# Patient Record
Sex: FEMALE | Race: WHITE | Employment: OTHER | URBAN - METROPOLITAN AREA
[De-identification: names, ages, dates, MRNs, and addresses within clinical notes are randomized per-mention and may not be internally consistent; named-entity substitution may affect disease eponyms.]

---

## 2017-07-24 ENCOUNTER — OFFICE VISIT (OUTPATIENT)
Dept: INTERNAL MEDICINE | Facility: CLINIC | Age: 72
End: 2017-07-24

## 2017-07-24 VITALS
HEIGHT: 63 IN | SYSTOLIC BLOOD PRESSURE: 130 MMHG | WEIGHT: 144 LBS | DIASTOLIC BLOOD PRESSURE: 64 MMHG | BODY MASS INDEX: 25.52 KG/M2

## 2017-07-24 DIAGNOSIS — G31.84 MILD COGNITIVE IMPAIRMENT WITH MEMORY LOSS: Primary | ICD-10-CM

## 2017-07-24 DIAGNOSIS — E78.00 PURE HYPERCHOLESTEROLEMIA: ICD-10-CM

## 2017-07-24 DIAGNOSIS — Z00.00 ROUTINE HEALTH MAINTENANCE: ICD-10-CM

## 2017-07-24 DIAGNOSIS — Z11.59 SCREENING FOR VIRAL DISEASE: ICD-10-CM

## 2017-07-24 DIAGNOSIS — E55.9 VITAMIN D DEFICIENCY: ICD-10-CM

## 2017-07-24 LAB
25(OH)D3 SERPL-MCNC: 24.8 NG/ML
ALBUMIN SERPL-MCNC: 4.3 G/DL (ref 3.2–4.8)
ALBUMIN/GLOB SERPL: 1.5 G/DL (ref 1.5–2.5)
ALP SERPL-CCNC: 93 U/L (ref 25–100)
ALT SERPL W P-5'-P-CCNC: 16 U/L (ref 7–40)
ANION GAP SERPL CALCULATED.3IONS-SCNC: 8 MMOL/L (ref 3–11)
ARTICHOKE IGE QN: 178 MG/DL (ref 0–130)
AST SERPL-CCNC: 21 U/L (ref 0–33)
BASOPHILS # BLD AUTO: 0.05 10*3/MM3 (ref 0–0.2)
BASOPHILS NFR BLD AUTO: 0.5 % (ref 0–1)
BILIRUB SERPL-MCNC: 0.6 MG/DL (ref 0.3–1.2)
BUN BLD-MCNC: 11 MG/DL (ref 9–23)
BUN/CREAT SERPL: 15.7 (ref 7–25)
CALCIUM SPEC-SCNC: 9.6 MG/DL (ref 8.7–10.4)
CHLORIDE SERPL-SCNC: 107 MMOL/L (ref 99–109)
CHOLEST SERPL-MCNC: 275 MG/DL (ref 0–200)
CO2 SERPL-SCNC: 27 MMOL/L (ref 20–31)
CREAT BLD-MCNC: 0.7 MG/DL (ref 0.6–1.3)
DEPRECATED RDW RBC AUTO: 42.6 FL (ref 37–54)
EOSINOPHIL # BLD AUTO: 0.11 10*3/MM3 (ref 0–0.3)
EOSINOPHIL NFR BLD AUTO: 1.2 % (ref 0–3)
ERYTHROCYTE [DISTWIDTH] IN BLOOD BY AUTOMATED COUNT: 13.1 % (ref 11.3–14.5)
GFR SERPL CREATININE-BSD FRML MDRD: 82 ML/MIN/1.73
GLOBULIN UR ELPH-MCNC: 2.8 GM/DL
GLUCOSE BLD-MCNC: 90 MG/DL (ref 70–100)
HCT VFR BLD AUTO: 44.1 % (ref 34.5–44)
HCV AB SER DONR QL: NORMAL
HDLC SERPL-MCNC: 81 MG/DL (ref 40–60)
HGB BLD-MCNC: 14.9 G/DL (ref 11.5–15.5)
IMM GRANULOCYTES # BLD: 0.03 10*3/MM3 (ref 0–0.03)
IMM GRANULOCYTES NFR BLD: 0.3 % (ref 0–0.6)
LYMPHOCYTES # BLD AUTO: 1.91 10*3/MM3 (ref 0.6–4.8)
LYMPHOCYTES NFR BLD AUTO: 20.5 % (ref 24–44)
MCH RBC QN AUTO: 30 PG (ref 27–31)
MCHC RBC AUTO-ENTMCNC: 33.8 G/DL (ref 32–36)
MCV RBC AUTO: 88.7 FL (ref 80–99)
MONOCYTES # BLD AUTO: 0.58 10*3/MM3 (ref 0–1)
MONOCYTES NFR BLD AUTO: 6.2 % (ref 0–12)
NEUTROPHILS # BLD AUTO: 6.64 10*3/MM3 (ref 1.5–8.3)
NEUTROPHILS NFR BLD AUTO: 71.3 % (ref 41–71)
PLATELET # BLD AUTO: 264 10*3/MM3 (ref 150–450)
PMV BLD AUTO: 10.4 FL (ref 6–12)
POTASSIUM BLD-SCNC: 4.6 MMOL/L (ref 3.5–5.5)
PROT SERPL-MCNC: 7.1 G/DL (ref 5.7–8.2)
RBC # BLD AUTO: 4.97 10*6/MM3 (ref 3.89–5.14)
SODIUM BLD-SCNC: 142 MMOL/L (ref 132–146)
TRIGL SERPL-MCNC: 140 MG/DL (ref 0–150)
TSH SERPL DL<=0.05 MIU/L-ACNC: 2.19 MIU/ML (ref 0.35–5.35)
WBC NRBC COR # BLD: 9.32 10*3/MM3 (ref 3.5–10.8)

## 2017-07-24 PROCEDURE — 82306 VITAMIN D 25 HYDROXY: CPT | Performed by: INTERNAL MEDICINE

## 2017-07-24 PROCEDURE — 85025 COMPLETE CBC W/AUTO DIFF WBC: CPT | Performed by: INTERNAL MEDICINE

## 2017-07-24 PROCEDURE — 84443 ASSAY THYROID STIM HORMONE: CPT | Performed by: INTERNAL MEDICINE

## 2017-07-24 PROCEDURE — 80061 LIPID PANEL: CPT | Performed by: INTERNAL MEDICINE

## 2017-07-24 PROCEDURE — 86803 HEPATITIS C AB TEST: CPT | Performed by: INTERNAL MEDICINE

## 2017-07-24 PROCEDURE — 99215 OFFICE O/P EST HI 40 MIN: CPT | Performed by: INTERNAL MEDICINE

## 2017-07-24 PROCEDURE — 80053 COMPREHEN METABOLIC PANEL: CPT | Performed by: INTERNAL MEDICINE

## 2017-07-24 NOTE — PROGRESS NOTES
Chief Complaint   Patient presents with   • Follow-up     Mild cognitive impairment with memory loss       History of Present Illness  71 y.o.  woman, accompanied by her daughter Ana, presents for follow-up re: memory issues.  Patient herself doesn't feel significant concerns.  Daughter states that patient has become delinquent with bills.  She is asking the same questions repetitively.  There are some concerned about her driving.  There is an issue with short term memory.  Example, patient keeps buying lotions, and now has about 30 bottles at her home.  Finances have become an issue with large quantities of $ are missing from the bank.  Ana is looking into assisted living but patient is reluctant.    Patient enjoys reading.     Family is working on selling her house.    Patient thinks she took the rest of the donepezil but daughter does not think so and notes s/e of diarrhea.  They did not start the exelon because patient has sensitive skin at baseline; can't even use papertape for procedures/surgeries.      Review of Systems  ROS neg for CP, palpitations, SOB, lightheadedness, falls, mood changes.  Continues to dress herself and perform her ADLs.  Daughter states patient is gaining weight, therefore no concerns bout not eating.  All other ROS reviewed and negative.    [facility]  The following portions of the patient's history were reviewed and updated as appropriate: allergies, current medications, past family history, past medical history, past social history, past surgical history and problem list.  SH: lives alone and currently manages own meds; no family here in [facility]; daughter Lv lives in [place]; son with the grandson just moved to [place], [place]      Current Outpatient Prescriptions:   •  Ascorbic Acid (VITAMIN C ER) 1000 MG tablet controlled-release, Take 1 tablet by mouth daily., Disp: , Rfl:   •  Calcium Carbonate (CALCIUM CHEW) 500 MG chewable tablet, Chew 1 tablet every other day., Disp: ,  "Rfl:   •  Potassium 75 MG tablet, Take 1 tablet by mouth every other day., Disp: , Rfl:   •  raloxifene (EVISTA) 60 MG tablet, TAKE 1 TABLET BY MOUTH EVERY DAY, Disp: 90 tablet, Rfl: 0    VITALS:  /64  Ht 62.5\" (158.8 cm)  Wt 144 lb (65.3 kg)  BMI 25.92 kg/m2    Physical Exam   Constitutional: She is oriented to person, place, and time. She appears well-developed and well-nourished.   Eyes: Conjunctivae and EOM are normal.   Cardiovascular: Normal rate, regular rhythm and normal heart sounds.    Pulmonary/Chest: Effort normal and breath sounds normal.   Abdominal: Soft. Bowel sounds are normal.   Musculoskeletal:   Normal gait   Neurological: She is alert and oriented to person, place, and time.   Psychiatric: She has a normal mood and affect. Her behavior is normal.   Nursing note and vitals reviewed.    LABS  5/16 B12 358, TSH 1.569    ASSESSMENT/PLAN  Problem List Items Addressed This Visit     Hyperlipidemia     Update lipids, goal LDL < 130         Vitamin D deficiency     Check level and rec supplementation as indicated; currently only with Ca supplementation - patient's daughter states patient has not been taking OTC vit D         Relevant Orders    Vitamin D 25 Hydroxy (Completed)    Mild cognitive impairment with memory loss - Primary     Reviewed stable MMSE 27/30 and review not unusual to lose short term memory first; reviewed med options, noting diarrhea with donepezil and skin sensitivity to exelon patch; proceed with Namenda XR started pack - 4 week ramp-up to 28mg QD - call back for RX if tolerates medication; daughter states she is already POA; discussed assisted living facility could actually be good for mental health with increased social stimulation; offered neuro consultation, which patient/daughter declined; discussed option of ST consult for cognitive therapy; discussed [facility] OT driving eval to assess safety of driving; in interim, strongly recommend \"brain exercises\" not only with " reading but other puzzles/exercises (she does not want to do coloring books)         Relevant Orders    TSH (Completed)      Other Visit Diagnoses     Screening for viral disease        Relevant Orders    Hepatitis C Antibody (Completed)    Routine health maintenance        Relevant Orders    CBC & Differential (Completed)    Comprehensive Metabolic Panel (Completed)    Lipid Panel (Completed)    Urinalysis With Microscopic    CBC Auto Differential (Completed)    Urinalysis    Urinalysis, Microscopic Only      Time Documentation  Counseled patient and daughter Lv  Counseling topics: diagnosis, treatment options, follow up plan and discussion of mental health issues  Total encounter time: counseling time more than 50% of visit: 45 minutes    FOLLOW-UP  RTC 1mo for updated wellness visit and f/u on initiation of Namenda XR; labs done on the way out the door    Electronically signed by:    Denia Rodriguez MD  07/24/2017

## 2017-07-25 NOTE — ASSESSMENT & PLAN NOTE
Check level and rec supplementation as indicated; currently only with Ca supplementation - patient's daughter states patient has not been taking OTC vit D

## 2017-07-25 NOTE — PROGRESS NOTES
Labs overall stable except for very abnormal cholesterol profile and bord vitamin D. Will review details at your wellness visit in 1 month.  In the interim, add OTC vitamin D3 1000 units daily and decrease the saturated fats/fried foods in the diet

## 2017-07-25 NOTE — ASSESSMENT & PLAN NOTE
"Reviewed stable MMSE 27/30 and review not unusual to lose short term memory first; reviewed med options, noting diarrhea with donepezil and skin sensitivity to exelon patch; proceed with Namenda XR started pack - 4 week ramp-up to 28mg QD - call back for RX if tolerates medication; daughter states she is already POA; discussed assisted living facility could actually be good for mental health with increased social stimulation; offered neuro consultation, which patient/daughter declined; discussed option of ST consult for cognitive therapy; discussed [facility] OT driving eval to assess safety of driving; in interim, strongly recommend \"brain exercises\" not only with reading but other puzzles/exercises (she does not want to do coloring books)  "

## 2017-07-26 ENCOUNTER — TELEPHONE (OUTPATIENT)
Dept: INTERNAL MEDICINE | Facility: CLINIC | Age: 72
End: 2017-07-26

## 2017-07-26 NOTE — TELEPHONE ENCOUNTER
----- Message from Denia Rodriguez MD sent at 7/25/2017  1:14 AM EDT -----  Labs overall stable except for very abnormal cholesterol profile and bord vitamin D. Will review details at your wellness visit in 1 month.  In the interim, add OTC vitamin D3 1000 units daily and decrease the saturated fats/fried foods in the diet

## 2017-08-29 ENCOUNTER — OFFICE VISIT (OUTPATIENT)
Dept: INTERNAL MEDICINE | Facility: CLINIC | Age: 72
End: 2017-08-29

## 2017-08-29 VITALS — WEIGHT: 140 LBS | DIASTOLIC BLOOD PRESSURE: 70 MMHG | BODY MASS INDEX: 25.2 KG/M2 | SYSTOLIC BLOOD PRESSURE: 126 MMHG

## 2017-08-29 DIAGNOSIS — H93.11 TINNITUS, RIGHT EAR: ICD-10-CM

## 2017-08-29 DIAGNOSIS — M81.0 OSTEOPOROSIS: ICD-10-CM

## 2017-08-29 DIAGNOSIS — G31.84 MILD COGNITIVE IMPAIRMENT WITH MEMORY LOSS: ICD-10-CM

## 2017-08-29 DIAGNOSIS — Z12.11 ENCOUNTER FOR SCREENING COLONOSCOPY: ICD-10-CM

## 2017-08-29 DIAGNOSIS — E78.00 PURE HYPERCHOLESTEROLEMIA: ICD-10-CM

## 2017-08-29 DIAGNOSIS — Z00.00 MEDICARE ANNUAL WELLNESS VISIT, SUBSEQUENT: Primary | ICD-10-CM

## 2017-08-29 DIAGNOSIS — E55.9 VITAMIN D DEFICIENCY: ICD-10-CM

## 2017-08-29 DIAGNOSIS — C50.912 MALIGNANT NEOPLASM OF LEFT FEMALE BREAST, UNSPECIFIED SITE OF BREAST: ICD-10-CM

## 2017-08-29 DIAGNOSIS — Z12.31 ENCOUNTER FOR SCREENING MAMMOGRAM FOR MALIGNANT NEOPLASM OF BREAST: ICD-10-CM

## 2017-08-29 PROCEDURE — G0439 PPPS, SUBSEQ VISIT: HCPCS | Performed by: INTERNAL MEDICINE

## 2017-08-29 PROCEDURE — 93000 ELECTROCARDIOGRAM COMPLETE: CPT | Performed by: INTERNAL MEDICINE

## 2017-08-29 PROCEDURE — G0444 DEPRESSION SCREEN ANNUAL: HCPCS | Performed by: INTERNAL MEDICINE

## 2017-08-29 PROCEDURE — 90662 IIV NO PRSV INCREASED AG IM: CPT | Performed by: INTERNAL MEDICINE

## 2017-08-29 PROCEDURE — 99214 OFFICE O/P EST MOD 30 MIN: CPT | Performed by: INTERNAL MEDICINE

## 2017-08-29 PROCEDURE — G0008 ADMIN INFLUENZA VIRUS VAC: HCPCS | Performed by: INTERNAL MEDICINE

## 2017-08-29 RX ORDER — MELATONIN
1000 DAILY
COMMUNITY

## 2017-08-29 RX ORDER — RALOXIFENE HYDROCHLORIDE 60 MG/1
60 TABLET, FILM COATED ORAL DAILY
Qty: 90 TABLET | Refills: 3 | Status: SHIPPED | OUTPATIENT
Start: 2017-08-29 | End: 2019-04-07 | Stop reason: SDUPTHER

## 2017-08-29 RX ORDER — ASPIRIN 81 MG/1
81 TABLET ORAL DAILY
COMMUNITY

## 2017-08-29 RX ORDER — MEMANTINE HYDROCHLORIDE 28 MG/1
28 CAPSULE, EXTENDED RELEASE ORAL DAILY
COMMUNITY
End: 2017-08-29 | Stop reason: SDUPTHER

## 2017-08-29 RX ORDER — MEMANTINE HYDROCHLORIDE 28 MG/1
28 CAPSULE, EXTENDED RELEASE ORAL DAILY
Qty: 90 CAPSULE | Refills: 3 | Status: SHIPPED | OUTPATIENT
Start: 2017-08-29

## 2017-08-29 NOTE — ASSESSMENT & PLAN NOTE
Remains on Namenda XR 28mg QD #90, 3RF without s/e; strongly encouraged consideration of CBT with speech therapy for further reinforcement for memory learning/exercises; also rec consider [facility] Driving Eval to ensure safety on the roads; patient likely not fully cognizant of extent of memory loss and is compensating

## 2017-08-29 NOTE — ASSESSMENT & PLAN NOTE
Lipids elevated with ; decrease saturated fats and cholesterol in the diet; repeat lipids in 3-4 mos

## 2017-08-29 NOTE — ASSESSMENT & PLAN NOTE
Calcium and vitamin D supplementation with weight-bearing exercise; DEXA ordered (last 12/14); note also on raloxifene 60mg QD

## 2017-08-29 NOTE — PROGRESS NOTES
ANNUAL WELLNESS VISIT    DRUG AND ALCOHOL USE      no tobacco use, alcohol intake:social drinker and caffeine intake: 1 cups of caffeinated coffee per day    DIET AND PHYSICAL ACTIVITY     Diet: general    Exercise: frequently   Exercise Details: walking    MOOD DISORDER AND COGNITIVE SCREENING   Depression Screening Tool Used yes - see PHQ-9   Anxiety Screening Tool Used yes     Mini-Cog Performed   Yes    1. Tell Patient 3 Words table,car,book    2. Administer Clock Test normal    3. Recall 3 words  table,car,book    4. Number Correct Items 3    FUNCTIONAL ABILITY AND LEVEL OF SAFETY   Hearing no hearing loss     Wears Hearing Aids No       Current Activities Independent      none  - see Funct/Cog Status Intake     Fall Risk Assessment       Has difficulty with walking or balance  No         Timed Up and Go (TUG) Test  6 sec.       If >12 sec, normal    ADVANCED DIRECTIVE has an advance directive - a copy has been provided and is in file    PAIN SCREENING Do you have pain right now? no        Recent Hospitalizations:  No hospitalization(s) within the last year..     MEDICATION REVIEW   - updated and reviewed (see Medication List).   - reviewed for potentially harmful drug-disease interactions in the elderly.   - reviewed for high risk medications in the elderly.   - aspirin use: already on 81mg QD ASA  ____________________________________  Chief Complaint   Patient presents with   • Medicare Wellness       History of Present Illness  71 y.o.  woman presents for updated wellness visit.  Finished 6 wks of namenda about 1 week ago without side effects.  Requesting refill for that and evista.  Not sure who has been prescribing evista.  Not sure when last mammogram or eye exam was.  States she sees eye doctor Dr. Kitchen every 6 mos.    Review of Systems  Denies headaches, visual changes, CP, palpitations, SOB, cough, abd pain, n/v/d, difficulty with urination, numbness/tingling, falls, mood changes,  lightheadedness, hearing changes, rashes.    Notes hearing is good and can hear pin drop but complains of ringing of the ears R>L.  Notes remote h/o seeing ENT, who just told her to buy a noise box to use at her bedside; patient did not follow this rec.     ROS (+) for ringing of ears R>L,      All other ROS reviewed and negative.    [facility]  The following portions of the patient's history were reviewed and updated as appropriate: allergies, current medications, past family history, past medical history, past social history, past surgical history and problem list.      Current Outpatient Prescriptions:   •  aspirin 81 MG EC tablet, Take 81 mg by mouth Daily., Disp: , Rfl:   •  cholecalciferol (VITAMIN D3) 1000 units tablet, Take 1,000 Units by mouth Daily., Disp: , Rfl:   •  memantine (NAMENDA XR) 28 MG capsule sustained-release 24 hr extended release capsule, Take 1 capsule by mouth Daily., Disp: 90 capsule, Rfl: 3  •  Ascorbic Acid (VITAMIN C ER) 1000 MG tablet controlled-release, Take 1 tablet by mouth daily., Disp: , Rfl:   •  Calcium Carbonate (CALCIUM CHEW) 500 MG chewable tablet, Chew 1 tablet every other day., Disp: , Rfl:   •  Potassium 75 MG tablet, Take 1 tablet by mouth every other day., Disp: , Rfl:   •  raloxifene (EVISTA) 60 MG tablet, Take 1 tablet by mouth Daily., Disp: 90 tablet, Rfl: 3    VITALS:  /70  Wt 140 lb (63.5 kg)  BMI 25.2 kg/m2    Physical Exam   Constitutional: She is oriented to person, place, and time. She appears well-developed and well-nourished.   HENT:   Head: Normocephalic.   Right Ear: External ear normal.   Left Ear: External ear normal.   Nose: Nose normal.   Mouth/Throat: Oropharynx is clear and moist and mucous membranes are normal. No oropharyngeal exudate.   Mild cerumen bilat ears; finger rubbing hearing intact bilaterally   Eyes: Conjunctivae and EOM are normal. Pupils are equal, round, and reactive to light.   Neck: Normal range of motion. Neck supple. Carotid  bruit is not present (bilaterally). No thyromegaly present.   Cardiovascular: Normal rate, regular rhythm and normal heart sounds.    Pulmonary/Chest: Effort normal and breath sounds normal. No respiratory distress.   Abdominal: Soft. Bowel sounds are normal. She exhibits no distension and no mass. There is no hepatosplenomegaly. There is no tenderness.   Genitourinary:   Genitourinary Comments: Breast exam unremarkable reconstruction on left side and unremarkable on right side, without masses, skin changes, nipple discharge, or axillary adenopathy.     Musculoskeletal: Normal range of motion. She exhibits no edema.   Lymphadenopathy:     She has no cervical adenopathy.   Neurological: She is alert and oriented to person, place, and time. She has normal reflexes. She displays normal reflexes. No cranial nerve deficit.   Skin: Skin is warm and dry. No rash noted.   Psychiatric: She has a normal mood and affect. Her behavior is normal.   Nursing note and vitals reviewed.      LABS  Results for orders placed or performed in visit on 07/24/17   Comprehensive Metabolic Panel   Result Value Ref Range    Glucose 90 70 - 100 mg/dL    BUN 11 9 - 23 mg/dL    Creatinine 0.70 0.60 - 1.30 mg/dL    Sodium 142 132 - 146 mmol/L    Potassium 4.6 3.5 - 5.5 mmol/L    Chloride 107 99 - 109 mmol/L    CO2 27.0 20.0 - 31.0 mmol/L    Calcium 9.6 8.7 - 10.4 mg/dL    Total Protein 7.1 5.7 - 8.2 g/dL    Albumin 4.30 3.20 - 4.80 g/dL    ALT (SGPT) 16 7 - 40 U/L    AST (SGOT) 21 0 - 33 U/L    Alkaline Phosphatase 93 25 - 100 U/L    Total Bilirubin 0.6 0.3 - 1.2 mg/dL    eGFR Non African Amer 82 >60 mL/min/1.73    Globulin 2.8 gm/dL    A/G Ratio 1.5 1.5 - 2.5 g/dL    BUN/Creatinine Ratio 15.7 7.0 - 25.0    Anion Gap 8.0 3.0 - 11.0 mmol/L   Lipid Panel   Result Value Ref Range    Total Cholesterol 275 (H) 0 - 200 mg/dL    Triglycerides 140 0 - 150 mg/dL    HDL Cholesterol 81 (H) 40 - 60 mg/dL    LDL Cholesterol  178 (H) 0 - 130 mg/dL   TSH    Result Value Ref Range    TSH 2.195 0.350 - 5.350 mIU/mL   Vitamin D 25 Hydroxy   Result Value Ref Range    25 Hydroxy, Vitamin D 24.8 ng/ml   Hepatitis C Antibody   Result Value Ref Range    Hepatitis C Ab Non-Reactive Non-Reactive   CBC Auto Differential   Result Value Ref Range    WBC 9.32 3.50 - 10.80 10*3/mm3    RBC 4.97 3.89 - 5.14 10*6/mm3    Hemoglobin 14.9 11.5 - 15.5 g/dL    Hematocrit 44.1 (H) 34.5 - 44.0 %    MCV 88.7 80.0 - 99.0 fL    MCH 30.0 27.0 - 31.0 pg    MCHC 33.8 32.0 - 36.0 g/dL    RDW 13.1 11.3 - 14.5 %    RDW-SD 42.6 37.0 - 54.0 fl    MPV 10.4 6.0 - 12.0 fL    Platelets 264 150 - 450 10*3/mm3    Neutrophil % 71.3 (H) 41.0 - 71.0 %    Lymphocyte % 20.5 (L) 24.0 - 44.0 %    Monocyte % 6.2 0.0 - 12.0 %    Eosinophil % 1.2 0.0 - 3.0 %    Basophil % 0.5 0.0 - 1.0 %    Immature Grans % 0.3 0.0 - 0.6 %    Neutrophils, Absolute 6.64 1.50 - 8.30 10*3/mm3    Lymphocytes, Absolute 1.91 0.60 - 4.80 10*3/mm3    Monocytes, Absolute 0.58 0.00 - 1.00 10*3/mm3    Eosinophils, Absolute 0.11 0.00 - 0.30 10*3/mm3    Basophils, Absolute 0.05 0.00 - 0.20 10*3/mm3    Immature Grans, Absolute 0.03 0.00 - 0.03 10*3/mm3       ECG 12 Lead  Date/Time: 8/29/2017 3:35 PM  Performed by: KAREN KISER  Authorized by: KAREN KISER   Comparison: compared with previous ECG from 8/18/2016  Similar to previous ECG  Rhythm: sinus rhythm  Rate: normal  BPM: 80  Conduction: conduction normal  ST Segments: ST segments normal  T Waves: T waves normal  QRS axis: normal  Other findings: PRWP  Clinical impression comment: stable EKG              ASSESSMENT/PLAN  Problem List Items Addressed This Visit     Left breast cancer     On evista 60mg QD #90, 3RF; ordered R. mammo screening, just in case it has not been updated         Relevant Medications    raloxifene (EVISTA) 60 MG tablet    Other Relevant Orders    Mammo screening modified right w CAD    Hyperlipidemia     Lipids elevated with ; decrease saturated fats and  cholesterol in the diet; repeat lipids in 3-4 mos           Relevant Orders    Lipid Panel    Osteoporosis     Calcium and vitamin D supplementation with weight-bearing exercise; DEXA ordered (last 12/14); note also on raloxifene 60mg QD           Relevant Medications    raloxifene (EVISTA) 60 MG tablet    Other Relevant Orders    DEXA Bone Density Axial    Vitamin D deficiency     Bord at 24.8; rec add OTC vit D3 1000 units QD in the fall/winter months         Mild cognitive impairment with memory loss     Remains on Namenda XR 28mg QD #90, 3RF without s/e; strongly encouraged consideration of CBT with speech therapy for further reinforcement for memory learning/exercises; also [facility] on [facility] roads; patient likely not fully cognizant of extent of memory loss and is compensating         Relevant Medications    memantine (NAMENDA XR) 28 MG capsule sustained-release 24 hr extended release capsule    Medicare annual wellness visit, subsequent - Primary     Health maintenance - flu vacc given today; Prevnar 11/15, PVX/Tdap/Zostavax 9/11; R. mammo ordered (h/o L. breast CA); no further Paps unless abnlities; DEXA ordered (last 12/14); colon CA screening review - proceed with Cologuard; eye exam regular with Dr. Kitchen; dental exam UTD, every 6 mos    Consultants:  Patient Care Team:  Denia Rodriguez MD as PCP - General  Denia Rodriguez MD as PCP - Internal Medicine  Ravinder Alejandro MD as Consulting Physician (General Surgery)  Maxwell Hyatt MD as Consulting Physician (Cardiothoracic Surgery)  Florencio Kitchen MD as Consulting Physician               Other Visit Diagnoses     Encounter for screening colonoscopy        Relevant Orders    Cologuard    Encounter for screening mammogram for malignant neoplasm of breast         Relevant Orders    Mammo screening modified right w CAD    Tinnitus, right ear        no hearing loss per patient; consider inner ear fluid; consider ENT  consultation, which patient declines for now          FOLLOW-UP  RTC 3-4 mos with f/u lipids (Escribed)    Electronically signed by:    Denia Rodriguez MD  08/29/2017

## 2017-09-28 ENCOUNTER — APPOINTMENT (OUTPATIENT)
Dept: BONE DENSITY | Facility: HOSPITAL | Age: 72
End: 2017-09-28
Attending: INTERNAL MEDICINE

## 2017-09-28 ENCOUNTER — APPOINTMENT (OUTPATIENT)
Dept: MAMMOGRAPHY | Facility: HOSPITAL | Age: 72
End: 2017-09-28
Attending: INTERNAL MEDICINE

## 2017-12-28 ENCOUNTER — TELEPHONE (OUTPATIENT)
Dept: INTERNAL MEDICINE | Facility: CLINIC | Age: 72
End: 2017-12-28

## 2017-12-28 NOTE — TELEPHONE ENCOUNTER
Spoke with patient regarding her Cologuard study that was never done Patient stated she had been moving and had forgot about it and at this time she will not be doing it stated that she will discuss this at her next appointment with Dr Rodriguez

## 2018-09-11 ENCOUNTER — TELEPHONE (OUTPATIENT)
Dept: INTERNAL MEDICINE | Facility: CLINIC | Age: 73
End: 2018-09-11

## 2018-09-11 NOTE — TELEPHONE ENCOUNTER
Spoke with patient she has moved to [place], [place] will call office to schedule apt when she is able. She may be transferring care to a provider closer to her. Informed patient that she will need to be seen so that she may continue to have her RX refilled. Patient voiced understanding

## 2018-09-11 NOTE — TELEPHONE ENCOUNTER
----- Message from Denia Rodriguez MD sent at 9/3/2018  1:28 AM EDT -----  Regarding: cologuard overdue  Due for medicare wellness visit with physical labs now.  Please schedule and do fasting labs week prior to appt    Also we discussed Cologuard for colon CA screening and the order is now overdue.  Alternative is to just proceed with colonoscopy.

## 2019-04-05 ENCOUNTER — TELEPHONE (OUTPATIENT)
Dept: INTERNAL MEDICINE | Facility: CLINIC | Age: 74
End: 2019-04-05

## 2019-04-05 DIAGNOSIS — C50.912 MALIGNANT NEOPLASM OF LEFT FEMALE BREAST (HCC): ICD-10-CM

## 2019-04-05 DIAGNOSIS — M81.0 OSTEOPOROSIS: ICD-10-CM

## 2019-04-05 NOTE — TELEPHONE ENCOUNTER
PATIENT IS COMPLETELY OUT OF MEDICATION, SHE NEEDS EVISTA 60 MG. SHE HAS A WELLNESS VISIT SCHEDULED WITH DR KISER ON 4/30/19. SHE WANTS TO KNOW IF SHE CAN HAVE ENOUGH MEDICATION TO LAST TILL APPT. PATIENT WOULD LIKE IT CALLED IN TO [facility]. PT CAN BE REACHED [phone]

## 2019-04-07 RX ORDER — RALOXIFENE HYDROCHLORIDE 60 MG/1
60 TABLET, FILM COATED ORAL DAILY
Qty: 30 TABLET | Refills: 0 | Status: SHIPPED | OUTPATIENT
Start: 2019-04-07 | End: 2019-04-29 | Stop reason: SDUPTHER

## 2019-04-23 ENCOUNTER — TELEPHONE (OUTPATIENT)
Dept: INTERNAL MEDICINE | Facility: CLINIC | Age: 74
End: 2019-04-23

## 2019-04-23 DIAGNOSIS — E78.00 PURE HYPERCHOLESTEROLEMIA: ICD-10-CM

## 2019-04-23 DIAGNOSIS — E55.9 VITAMIN D DEFICIENCY: ICD-10-CM

## 2019-04-23 DIAGNOSIS — Z00.00 MEDICARE ANNUAL WELLNESS VISIT, SUBSEQUENT: Primary | ICD-10-CM

## 2019-04-30 DIAGNOSIS — M81.0 OSTEOPOROSIS: ICD-10-CM

## 2019-04-30 DIAGNOSIS — C50.912 MALIGNANT NEOPLASM OF LEFT FEMALE BREAST (HCC): ICD-10-CM

## 2019-05-01 RX ORDER — RALOXIFENE HYDROCHLORIDE 60 MG/1
60 TABLET, FILM COATED ORAL DAILY
Qty: 90 TABLET | Refills: 3 | OUTPATIENT
Start: 2019-05-01

## 2019-09-09 ENCOUNTER — CONVERSION ENCOUNTER (OUTPATIENT)
Dept: CARDIOLOGY | Facility: CLINIC | Age: 74
End: 2019-09-09
Attending: SPECIALIST

## 2020-01-23 ENCOUNTER — CONVERSION ENCOUNTER (OUTPATIENT)
Dept: NEUROLOGY | Facility: CLINIC | Age: 75
End: 2020-01-23

## 2020-01-23 ENCOUNTER — OFFICE VISIT CONVERTED (OUTPATIENT)
Dept: NEUROLOGY | Facility: CLINIC | Age: 75
End: 2020-01-23
Attending: PSYCHIATRY & NEUROLOGY

## 2021-05-15 VITALS
DIASTOLIC BLOOD PRESSURE: 64 MMHG | WEIGHT: 149 LBS | BODY MASS INDEX: 24.83 KG/M2 | HEIGHT: 65 IN | SYSTOLIC BLOOD PRESSURE: 147 MMHG | HEART RATE: 84 BPM